# Patient Record
Sex: FEMALE | Race: OTHER | NOT HISPANIC OR LATINO | URBAN - METROPOLITAN AREA
[De-identification: names, ages, dates, MRNs, and addresses within clinical notes are randomized per-mention and may not be internally consistent; named-entity substitution may affect disease eponyms.]

---

## 2024-06-19 ENCOUNTER — EMERGENCY (EMERGENCY)
Facility: HOSPITAL | Age: 29
LOS: 1 days | Discharge: HOME | End: 2024-06-19
Admitting: EMERGENCY MEDICINE
Payer: SELF-PAY

## 2024-06-19 VITALS
HEART RATE: 86 BPM | SYSTOLIC BLOOD PRESSURE: 132 MMHG | OXYGEN SATURATION: 95 % | DIASTOLIC BLOOD PRESSURE: 66 MMHG | RESPIRATION RATE: 18 BRPM | TEMPERATURE: 99 F

## 2024-06-19 DIAGNOSIS — H92.02 OTALGIA, LEFT EAR: ICD-10-CM

## 2024-06-19 DIAGNOSIS — H66.92 OTITIS MEDIA, UNSPECIFIED, LEFT EAR: ICD-10-CM

## 2024-06-19 DIAGNOSIS — H60.92 UNSPECIFIED OTITIS EXTERNA, LEFT EAR: ICD-10-CM

## 2024-06-19 PROCEDURE — 99284 EMERGENCY DEPT VISIT MOD MDM: CPT

## 2024-06-19 PROCEDURE — 99053 MED SERV 10PM-8AM 24 HR FAC: CPT

## 2024-06-19 RX ORDER — IBUPROFEN 200 MG
1 TABLET ORAL
Qty: 20 | Refills: 0
Start: 2024-06-19

## 2024-06-19 RX ORDER — CIPROFLOXACIN AND DEXAMETHASONE 3; 1 MG/ML; MG/ML
4 SUSPENSION/ DROPS AURICULAR (OTIC)
Qty: 1 | Refills: 0
Start: 2024-06-19 | End: 2024-06-25

## 2024-06-19 RX ORDER — ACETAMINOPHEN 500 MG
2 TABLET ORAL
Qty: 20 | Refills: 0
Start: 2024-06-19

## 2024-06-19 RX ORDER — PSEUDOEPHEDRINE HCL 30 MG
60 TABLET ORAL ONCE
Refills: 0 | Status: COMPLETED | OUTPATIENT
Start: 2024-06-19 | End: 2024-06-19

## 2024-06-19 RX ORDER — KETOROLAC TROMETHAMINE 30 MG/ML
30 SYRINGE (ML) INJECTION ONCE
Refills: 0 | Status: DISCONTINUED | OUTPATIENT
Start: 2024-06-19 | End: 2024-06-19

## 2024-06-19 RX ORDER — OXYCODONE HYDROCHLORIDE 5 MG/1
1 TABLET ORAL
Qty: 5 | Refills: 0
Start: 2024-06-19

## 2024-06-19 RX ORDER — CIPROFLOXACIN AND FLUOCINOLONE ACETONIDE .75; .0625 MG/.25ML; MG/.25ML
0.25 SOLUTION AURICULAR (OTIC) ONCE
Refills: 0 | Status: DISCONTINUED | OUTPATIENT
Start: 2024-06-19 | End: 2024-06-19

## 2024-06-19 RX ADMIN — Medication 30 MILLIGRAM(S): at 05:19

## 2024-06-19 RX ADMIN — Medication 60 MILLIGRAM(S): at 05:19

## 2024-06-19 RX ADMIN — Medication 1 TABLET(S): at 05:19

## 2024-06-19 NOTE — ED PROVIDER NOTE - PATIENT PORTAL LINK FT
You can access the FollowMyHealth Patient Portal offered by [facility] by registering at the following website: [email or web address]. By joining [employer]’s FollowMyHealth portal, you will also be able to view your health information using other applications (apps) compatible with our system.

## 2024-06-19 NOTE — ED PROVIDER NOTE - CARE PROVIDERS DIRECT ADDRESSES
[email or web address]

## 2024-06-19 NOTE — ED PROVIDER NOTE - NSFOLLOWUPINSTRUCTIONS_ED_ALL_ED_FT
Otitis Media, Adult  An ear, with close-ups of a normal ear and an ear filled with fluid.  Otitis media occurs when there is inflammation and fluid in the middle ear with signs and symptoms of an acute infection. The middle ear is a part of the ear that contains bones for hearing as well as air that helps send sounds to the brain. When infected fluid builds up in this space, it causes pressure and can lead to an ear infection. The eustachian tube connects the middle ear to the back of the nose (nasopharynx) and normally allows air into the middle ear. If the eustachian tube becomes blocked, fluid can build up and become infected.    What are the causes?  This condition is caused by a blockage in the eustachian tube. This can be caused by mucus or by swelling of the tube. Problems that can cause a blockage include:  A cold or other upper respiratory infection.  Allergies.  An irritant, such as tobacco smoke.  Enlarged adenoids. The adenoids are areas of soft tissue located high in the back of the throat, behind the nose and the roof of the mouth. They are part of the body's defense system (immune system).  A mass in the nasopharynx.  Damage to the ear caused by pressure changes (barotrauma).  What increases the risk?  You are more likely to develop this condition if you:  Smoke or are exposed to tobacco smoke.  Have an opening in the roof of your mouth (cleft palate).  Have gastroesophageal reflux.  Have an immune system disorder.  What are the signs or symptoms?  Symptoms of this condition include:  Ear pain.  Fever.  Decreased hearing.  Tiredness (lethargy).  Fluid leaking from the ear, if the eardrum is ruptured or has burst.  Ringing in the ear.  How is this diagnosed?  A health care provider checks a person's ear using an otoscope. A close-up of the ear and otoscope is also shown.  This condition is diagnosed with a physical exam. During the exam, your health care provider will use an instrument called an otoscope to look in your ear and check for redness, swelling, and fluid. He or she will also ask about your symptoms.    Your health care provider may also order tests, such as:  A pneumatic otoscopy. This is a test to check the movement of the eardrum. It is done by squeezing a small amount of air into the ear.  A tympanogram. This is a test that shows how well the eardrum moves in response to air pressure in the ear canal. It provides a graph for your health care provider to review.  How is this treated?  This condition can go away on its own within 3–5 days. But if the condition is caused by a bacterial infection and does not go away on its own, or if it keeps coming back, your health care provider may:  Prescribe antibiotic medicine to treat the infection.  Prescribe or recommend medicines to control pain.  Follow these instructions at home:  Take over-the-counter and prescription medicines only as told by your health care provider.  If you were prescribed an antibiotic medicine, take it as told by your health care provider. Do not stop taking the antibiotic even if you start to feel better.  Keep all follow-up visits. This is important.  Contact a health care provider if:  You have bleeding from your nose.  There is a lump on your neck.  You are not feeling better in 5 days.  You feel worse instead of better.  Get help right away if:  You have severe pain that is not controlled with medicine.  You have swelling, redness, or pain around your ear.  You have stiffness in your neck.  A part of your face is not moving (paralyzed).  The bone behind your ear (mastoid bone) is tender when you touch it.  You develop a severe headache.  Summary  Otitis media is redness, soreness, and swelling of the middle ear, usually resulting in pain and decreased hearing.  This condition can go away on its own within 3–5 days.  If the problem does not go away in 3–5 days, your health care provider may give you medicines to treat the infection.  If you were prescribed an antibiotic medicine, take it as told by your health care provider.  Follow all instructions that were given to you by your health care provider.  This information is not intended to replace advice given to you by your health care provider. Make sure you discuss any questions you have with your health care provider.    Otitis Externa  Ear anatomy showing the outer ear canal and the eardrum. The outer ear canal is red due to swimmer's ear.  Otitis externa is an infection of the outer ear canal. The outer ear canal is the area between the outside of the ear and the eardrum. Otitis externa is sometimes called swimmer's ear.    What are the causes?  Common causes of this condition include:  Swimming in dirty water.  Moisture in the ear.  An injury to the inside of the ear.  An object stuck in the ear.  A cut or scrape on the outside of the ear or in the ear canal.  What increases the risk?  You are more likely to get this condition if you go swimming often.    What are the signs or symptoms?  Itching in the ear. This is often the first symptom.  Swelling of the ear.  Redness in the ear.  Ear pain. The pain may get worse when you pull on your ear.  Pus coming from the ear.  How is this treated?  This condition may be treated with:  Antibiotic ear drops. These are often given for 10–14 days.  Medicines to reduce itching and swelling.  Follow these instructions at home:  If you were prescribed antibiotic ear drops, use them as told by your doctor. Do not stop using them even if you start to feel better.  Take over-the-counter and prescription medicines only as told by your doctor.  Avoid getting water in your ears as told by your doctor. You may be told to avoid swimming or water sports for a few days.  Keep all follow-up visits.  How is this prevented?  Keep your ears dry. Use the corner of a towel to dry your ears after you swim or bathe.  Try not to scratch or put things in your ear. Doing these things makes it easier for germs to grow in your ear.  Avoid swimming in lakes, dirty water, or swimming pools that may not have the right amount of a chemical called chlorine.  Contact a doctor if:  You have a fever.  Your ear is still red, swollen, or painful after 3 days.  You still have pus coming from your ear after 3 days.  Your redness, swelling, or pain gets worse.  You have a very bad headache.  Get help right away if:  You have redness, swelling, and pain or tenderness behind your ear.  Summary  Otitis externa is an infection of the outer ear canal.  Symptoms include pain, redness, and swelling of the ear.  If you were prescribed antibiotic ear drops, use them as told by your doctor. Do not stop using them even if you start to feel better.  Try not to scratch or put things in your ear.  This information is not intended to replace advice given to you by your health care provider. Make sure you discuss any questions you have with your health care provider.

## 2024-06-19 NOTE — ED PROVIDER NOTE - PHYSICAL EXAMINATION
Gen - WDWN F, tearful, in pain, non-toxic appearing  Skin - warm, dry, intact   HEENT - AT/NC, PERRL, EOMI, no nasal discharge, TM intact b/l but dull and bulging, L>R with maceration of the L ear canal, no perforation, no mastoid tenderness, no bleeding, airway patent, neck supple and FROM, mild palpable L cervical nodes noted   CV - S1S2, R/R/R  Resp - CTAB, no r/r/w  GI - soft, ND, NT, no CVAT b/l   MS - No acute or gross deformities noted to extremities. No midline spinal tenderness or step off on palpation  Neuro - AxOx3, no focal neuro deficits, ambulatory without gait disturbance

## 2024-06-19 NOTE — ED ADULT NURSE NOTE - NSFALLUNIVINTERV_ED_ALL_ED
Bed/Stretcher in lowest position, wheels locked, appropriate side rails in place/Call bell, personal items and telephone in reach/Instruct patient to call for assistance before getting out of bed/chair/stretcher/Non-slip footwear applied when patient is off stretcher/[place] to call system/Physically safe environment - no spills, clutter or unnecessary equipment/Purposeful proactive rounding/Room/bathroom lighting operational, light cord in reach

## 2024-06-19 NOTE — ED PROVIDER NOTE - OBJECTIVE STATEMENT
27 yo F with no known PMHx, currently visiting [place], recently returned from [place] a few days ago, presenting c/o worsening L ear pain x 2d. Pt reports mild throbbing sensation 3d ago, has been using OTC drops and advil/APAP with minimal improvement. Noted waking up with scant amount of yellowish dc from L ear today. Denies trauma, tinnitus, change in hearing/gait/balance, dysequilibrium, dizziness, fever, chills, FB sensation, cough, sore throat, d/c, bleeding, pruritus, N/V, SOB, CP, palpitations, focal weakness, and malaise.

## 2024-06-19 NOTE — ED PROVIDER NOTE - CLINICAL SUMMARY MEDICAL DECISION MAKING FREE TEXT BOX
pt p/w worsening L ear pain x 2d, now with scant amount of dc, Afebrile and no other systemic sx. Exam suggestive of AOE/AOM without s/s of mastoiditis, s/p ciprodex with augmentin, pain adequately controlled. Ddx, and f/u plans discussed in length with pt at bedside. AFVSS, pt non-toxic appearing and remained stable throughout the stay after ED interventions. D/c'd home to f/u with PMD and ENT. strict return precautions discussed, prompt return to ED for any worsening or new sx, pt verbalized understanding.

## 2024-06-19 NOTE — ED PROVIDER NOTE - CARE PROVIDER_API CALL
Manjeet Keating  Otolaryngology  [address]  [place] [address]  Phone: [phone]  Fax: [phone]  Follow Up Time:     your PMD,   Phone: (   )    -  Fax: (   )    -  Follow Up Time: